# Patient Record
Sex: FEMALE | Race: WHITE | Employment: FULL TIME | URBAN - METROPOLITAN AREA
[De-identification: names, ages, dates, MRNs, and addresses within clinical notes are randomized per-mention and may not be internally consistent; named-entity substitution may affect disease eponyms.]

---

## 2017-01-05 ENCOUNTER — LAB ENCOUNTER (OUTPATIENT)
Dept: LAB | Age: 27
End: 2017-01-05
Attending: DERMATOLOGY
Payer: COMMERCIAL

## 2017-01-05 DIAGNOSIS — L70.0 ACNE VULGARIS: ICD-10-CM

## 2017-01-05 DIAGNOSIS — Z79.899 ENCOUNTER FOR LONG-TERM (CURRENT) USE OF OTHER MEDICATIONS: ICD-10-CM

## 2017-01-05 DIAGNOSIS — L73.2 HIDRADENITIS SUPPURATIVA: Primary | ICD-10-CM

## 2017-01-05 LAB
AST SERPL-CCNC: 17 U/L (ref 15–41)
CHOLEST SERPL-MCNC: 165 MG/DL (ref 110–200)
CK SERPL-CCNC: 65 U/L (ref 38–234)
GGT SERPL-CCNC: 28 U/L (ref 7–50)
HCG SERPL QL: NEGATIVE
HDLC SERPL-MCNC: 34 MG/DL
LDLC SERPL CALC-MCNC: 113 MG/DL (ref 0–99)
NONHDLC SERPL-MCNC: 131 MG/DL
TRIGL SERPL-MCNC: 92 MG/DL (ref 1–149)

## 2017-01-05 PROCEDURE — 82977 ASSAY OF GGT: CPT

## 2017-01-05 PROCEDURE — 80061 LIPID PANEL: CPT

## 2017-01-05 PROCEDURE — 84703 CHORIONIC GONADOTROPIN ASSAY: CPT

## 2017-01-05 PROCEDURE — 82550 ASSAY OF CK (CPK): CPT

## 2017-01-05 PROCEDURE — 84450 TRANSFERASE (AST) (SGOT): CPT

## 2017-01-05 PROCEDURE — 36415 COLL VENOUS BLD VENIPUNCTURE: CPT

## 2017-02-03 ENCOUNTER — LAB ENCOUNTER (OUTPATIENT)
Dept: LAB | Age: 27
End: 2017-02-03
Attending: DERMATOLOGY
Payer: COMMERCIAL

## 2017-02-03 DIAGNOSIS — L57.8 NODULAR ELASTOSIS WITH CYSTS AND COMEDONES OF FAVRE AND RACOUCHOT: Primary | ICD-10-CM

## 2017-02-03 DIAGNOSIS — L70.0 NODULAR ELASTOSIS WITH CYSTS AND COMEDONES OF FAVRE AND RACOUCHOT: Primary | ICD-10-CM

## 2017-02-03 DIAGNOSIS — Z79.899 NEED FOR PROPHYLACTIC CHEMOTHERAPY: ICD-10-CM

## 2017-02-03 DIAGNOSIS — L73.2 HIDRADENITIS: ICD-10-CM

## 2017-02-03 LAB
AST SERPL-CCNC: 18 U/L (ref 15–41)
B-HCG SERPL-ACNC: 0 MIU/ML
CHOLEST SERPL-MCNC: 155 MG/DL (ref 110–200)
CK SERPL-CCNC: 53 U/L (ref 38–234)
GGT SERPL-CCNC: 34 U/L (ref 7–50)
HDLC SERPL-MCNC: 32 MG/DL
LDLC SERPL CALC-MCNC: 102 MG/DL (ref 0–99)
NONHDLC SERPL-MCNC: 123 MG/DL
TRIGL SERPL-MCNC: 107 MG/DL (ref 1–149)

## 2017-02-03 PROCEDURE — 80061 LIPID PANEL: CPT

## 2017-02-03 PROCEDURE — 82550 ASSAY OF CK (CPK): CPT

## 2017-02-03 PROCEDURE — 82977 ASSAY OF GGT: CPT

## 2017-02-03 PROCEDURE — 84450 TRANSFERASE (AST) (SGOT): CPT

## 2017-02-03 PROCEDURE — 36415 COLL VENOUS BLD VENIPUNCTURE: CPT

## 2017-02-03 PROCEDURE — 84702 CHORIONIC GONADOTROPIN TEST: CPT

## 2017-02-06 ENCOUNTER — TELEPHONE (OUTPATIENT)
Dept: FAMILY MEDICINE CLINIC | Facility: CLINIC | Age: 27
End: 2017-02-06

## 2017-02-06 DIAGNOSIS — R00.0 TACHYCARDIA: Primary | ICD-10-CM

## 2017-02-06 NOTE — TELEPHONE ENCOUNTER
Spoke with pt regarding message below. She states that there her HR is usually in the 80s at her therapy appts. I reviewed pt's chart and all her OV HRs are >100. Pt denies chest pain, sob, palpitations, n/v, dizziness, fever, weakness.  Pt states she feels

## 2017-02-06 NOTE — TELEPHONE ENCOUNTER
Therapist notice rested heart rate has been elavated  (90 to 92 ) for last few visits. - Therapist told her to give doctor a call   He seen her yesterday and is not sure if patient has contacted the doctor. Any questions please call Brett Wilkins

## 2017-02-06 NOTE — TELEPHONE ENCOUNTER
lmb ext [phone]   I spoke to Harris Garcia and he stated that he just wanted to inform you that pt resting heart rate has been elevated. He stated she does not have any s/sx.

## 2017-02-07 ENCOUNTER — NURSE ONLY (OUTPATIENT)
Dept: CARDIOLOGY CLINIC | Facility: CLINIC | Age: 27
End: 2017-02-07

## 2017-02-07 DIAGNOSIS — R00.0 TACHYCARDIA, UNSPECIFIED: Primary | ICD-10-CM

## 2017-02-07 PROCEDURE — 93227 XTRNL ECG REC<48 HR R&I: CPT | Performed by: INTERNAL MEDICINE

## 2017-02-07 PROCEDURE — 93225 XTRNL ECG REC<48 HRS REC: CPT | Performed by: INTERNAL MEDICINE

## 2017-02-07 NOTE — TELEPHONE ENCOUNTER
She is always a bit nervous in the office but should make sure nothing else occuring. I have ordered a 24 hour holter monitor. Pt to go to hospital to have it hooked up.

## 2017-02-07 NOTE — TELEPHONE ENCOUNTER
Patient notified of MD's recommendation. Patient verbalized understanding. Given # to Cardiology at [address] [place] [address] to set up appt for 24 hour holter monitor.

## 2017-02-15 NOTE — PROGRESS NOTES
Quick Note:    Tests are all normal. holter monitor.  I suspect her HR rises from mild anxiety with appointments.  ______

## 2017-02-21 ENCOUNTER — TELEPHONE (OUTPATIENT)
Dept: FAMILY MEDICINE CLINIC | Facility: CLINIC | Age: 27
End: 2017-02-21

## 2017-02-21 NOTE — TELEPHONE ENCOUNTER
----- Message from Joann Damon MD sent at 2/15/2017  2:49 PM CST -----  Tests are all normal. holter monitor. I suspect her HR rises from mild anxiety with appointments.

## 2017-03-09 ENCOUNTER — LAB ENCOUNTER (OUTPATIENT)
Dept: LAB | Age: 27
End: 2017-03-09
Attending: DERMATOLOGY
Payer: COMMERCIAL

## 2017-03-09 DIAGNOSIS — L70.0 ACNE VULGARIS: ICD-10-CM

## 2017-03-09 DIAGNOSIS — Z79.899 ENCOUNTER FOR LONG-TERM (CURRENT) DRUG USE: Primary | ICD-10-CM

## 2017-03-09 LAB — B-HCG SERPL-ACNC: <0.6 MIU/ML

## 2017-03-09 PROCEDURE — 36415 COLL VENOUS BLD VENIPUNCTURE: CPT

## 2017-03-09 PROCEDURE — 84702 CHORIONIC GONADOTROPIN TEST: CPT

## 2017-03-14 NOTE — PROGRESS NOTES
Quick Note:    Average heart rate on the holter monitor was in the 80s and normal rhythm throughout.  I believe her tachycardia at office visits is anxiety related and needs no further work up.  ______

## 2017-06-14 ENCOUNTER — LAB ENCOUNTER (OUTPATIENT)
Dept: LAB | Age: 27
End: 2017-06-14
Attending: Other
Payer: COMMERCIAL

## 2017-06-14 DIAGNOSIS — G12.9 SPINAL MUSCULAR ATROPHY, UNSPECIFIED (HCC): Primary | ICD-10-CM

## 2017-06-14 PROCEDURE — 81001 URINALYSIS AUTO W/SCOPE: CPT

## 2017-06-14 PROCEDURE — 82570 ASSAY OF URINE CREATININE: CPT

## 2017-06-14 PROCEDURE — 84156 ASSAY OF PROTEIN URINE: CPT

## 2017-08-24 ENCOUNTER — LAB ENCOUNTER (OUTPATIENT)
Dept: LAB | Age: 27
End: 2017-08-24
Attending: FAMILY MEDICINE
Payer: COMMERCIAL

## 2017-08-24 ENCOUNTER — TELEPHONE (OUTPATIENT)
Dept: FAMILY MEDICINE CLINIC | Facility: CLINIC | Age: 27
End: 2017-08-24

## 2017-08-24 ENCOUNTER — OFFICE VISIT (OUTPATIENT)
Dept: FAMILY MEDICINE CLINIC | Facility: CLINIC | Age: 27
End: 2017-08-24

## 2017-08-24 VITALS — HEART RATE: 97 BPM | SYSTOLIC BLOOD PRESSURE: 109 MMHG | DIASTOLIC BLOOD PRESSURE: 68 MMHG

## 2017-08-24 DIAGNOSIS — R10.9 ABDOMINAL CRAMPING: ICD-10-CM

## 2017-08-24 DIAGNOSIS — G12.1 SPINAL MUSCULAR ATROPHY TYPE III (HCC): Primary | ICD-10-CM

## 2017-08-24 DIAGNOSIS — Z00.00 ROUTINE MEDICAL EXAM: ICD-10-CM

## 2017-08-24 PROCEDURE — 99395 PREV VISIT EST AGE 18-39: CPT | Performed by: FAMILY MEDICINE

## 2017-08-24 PROCEDURE — 96372 THER/PROPH/DIAG INJ SC/IM: CPT | Performed by: FAMILY MEDICINE

## 2017-08-24 PROCEDURE — 83013 H PYLORI (C-13) BREATH: CPT

## 2017-08-24 RX ORDER — OMEPRAZOLE 20 MG/1
20 CAPSULE, DELAYED RELEASE ORAL
Qty: 30 CAPSULE | Refills: 5 | Status: SHIPPED | OUTPATIENT
Start: 2017-08-24 | End: 2017-08-24

## 2017-08-24 RX ORDER — AMITRIPTYLINE HYDROCHLORIDE 25 MG/1
25 TABLET, FILM COATED ORAL NIGHTLY
Qty: 30 TABLET | Refills: 11 | Status: SHIPPED | OUTPATIENT
Start: 2017-08-24 | End: 2018-08-24

## 2017-08-24 RX ORDER — OMEPRAZOLE 20 MG/1
20 CAPSULE, DELAYED RELEASE ORAL
Qty: 30 CAPSULE | Refills: 5 | Status: SHIPPED | OUTPATIENT
Start: 2017-08-24 | End: 2018-08-24

## 2017-08-24 RX ORDER — MEDROXYPROGESTERONE ACETATE 150 MG/ML
150 INJECTION, SUSPENSION INTRAMUSCULAR
Status: SHIPPED | OUTPATIENT
Start: 2017-08-24 | End: 2018-08-19

## 2017-08-24 RX ADMIN — MEDROXYPROGESTERONE ACETATE 150 MG: 150 INJECTION, SUSPENSION INTRAMUSCULAR at 01:00:00

## 2017-08-24 NOTE — PATIENT INSTRUCTIONS
Irritable Bowel Syndrome    Irritable bowel syndrome (IBS) is a disorder of the intestines. It is not a disease, but a group of symptoms caused by changes in the way the intestines work. It is fairly common, but the cause is not well understood.   Symptom · Look for factors that seem to worsen your symptoms. These include stress and emotions.   · Although stress does not cause IBS, it may trigger flare-ups. Counseling can help you learn to handle stress.  So can self-help measures like exercise, yoga, and me © 7378-6162 [address], [address]. All rights reserved. This information is not intended as a substitute for professional medical care. Always follow your healthcare professional's instructions.

## 2017-08-24 NOTE — TELEPHONE ENCOUNTER
[facility] calling regarding Dr. Mo Lee sending them medication but they have not received anything. .. please advise pt is there now

## 2017-08-24 NOTE — PROGRESS NOTES
HPI:   Felipe Velasquez is a 24year old female who presents for a complete physical exam.    Pt here for regular follow physical. Starts school after September.    Recently having bloating, cramping and then needs to go to the bathroom - has a few rounds then pain,denies heartburn  : denies dysuria, vaginal discharge or itching,irregular menses  MUSCULOSKELETAL: denies back pain  NEURO: denies headaches  PSYCHE: denies depression or anxiety  HEMATOLOGIC: denies hx of anemia or easy bruising  ENDOCRINE: denies

## 2017-08-25 LAB — H. PYLORI BREATH TEST: NEGATIVE

## 2017-09-28 ENCOUNTER — HOSPITAL ENCOUNTER (OUTPATIENT)
Age: 27
Discharge: HOME | End: 2017-09-28
Attending: PEDIATRICS
Payer: COMMERCIAL

## 2017-09-28 ENCOUNTER — TELEPHONE (OUTPATIENT)
Dept: SURGERY | Facility: CLINIC | Age: 27
End: 2017-09-28

## 2017-09-28 VITALS
HEART RATE: 118 BPM | HEIGHT: 63 IN | BODY MASS INDEX: 18.61 KG/M2 | DIASTOLIC BLOOD PRESSURE: 81 MMHG | WEIGHT: 105 LBS | RESPIRATION RATE: 20 BRPM | TEMPERATURE: 98 F | OXYGEN SATURATION: 99 % | SYSTOLIC BLOOD PRESSURE: 124 MMHG

## 2017-09-28 DIAGNOSIS — K61.1 PERIRECTAL ABSCESS: Primary | ICD-10-CM

## 2017-09-28 PROCEDURE — 99214 OFFICE O/P EST MOD 30 MIN: CPT

## 2017-09-28 PROCEDURE — 99213 OFFICE O/P EST LOW 20 MIN: CPT

## 2017-09-28 RX ORDER — AMOXICILLIN AND CLAVULANATE POTASSIUM 875; 125 MG/1; MG/1
1 TABLET, FILM COATED ORAL 2 TIMES DAILY
Qty: 20 TABLET | Refills: 0 | Status: SHIPPED | OUTPATIENT
Start: 2017-09-28 | End: 2017-10-08

## 2017-09-28 RX ORDER — AMOXICILLIN AND CLAVULANATE POTASSIUM 875; 125 MG/1; MG/1
1 TABLET, FILM COATED ORAL 2 TIMES DAILY
Qty: 20 TABLET | Refills: 0 | Status: SHIPPED | OUTPATIENT
Start: 2017-09-28 | End: 2017-09-28

## 2017-09-28 NOTE — TELEPHONE ENCOUNTER
Pt was seen at immediate care today for perianal abscess pt was advised to f/u with general sx in 3 days, pls advise thank you.

## 2017-10-03 ENCOUNTER — OFFICE VISIT (OUTPATIENT)
Dept: SURGERY | Facility: CLINIC | Age: 27
End: 2017-10-03

## 2017-10-03 VITALS — HEIGHT: 64 IN | WEIGHT: 106 LBS | BODY MASS INDEX: 18.1 KG/M2

## 2017-10-03 DIAGNOSIS — L02.31 ABSCESS OF RIGHT BUTTOCK: Primary | ICD-10-CM

## 2017-10-03 DIAGNOSIS — L73.2 HIDRADENITIS: ICD-10-CM

## 2017-10-03 PROCEDURE — 99212 OFFICE O/P EST SF 10 MIN: CPT | Performed by: SURGERY

## 2017-10-03 PROCEDURE — 99244 OFF/OP CNSLTJ NEW/EST MOD 40: CPT | Performed by: SURGERY

## 2017-10-03 NOTE — PROGRESS NOTES
History and Physical      Lisbeth Marin is a 24year old female. HPI   Patient presents with:  Cyst: Cyst on buttock, seen at [facility] 9/28/17, prescribed Amoxicillin. Patient states no pain, did have drainage, bloody, but no drainage now.   Patient is whee Social History Main Topics    Smoking status: Never Smoker                                                                Smokeless tobacco: Never Used                        Alcohol use: No              Drug use:  No              Sexual activity: Yes reviewed. Moist heat as able, po abx, dry gauze, close medical and dermatology f/u. Consider excision of area and further w/u for IBD if recurrent infections in the same spot. Continue routine wheelchair habits and skin care.            10/3/2017  Amrit Hearn

## 2017-10-06 ENCOUNTER — LAB ENCOUNTER (OUTPATIENT)
Dept: LAB | Age: 27
End: 2017-10-06
Payer: COMMERCIAL

## 2017-10-06 PROCEDURE — 93005 ELECTROCARDIOGRAM TRACING: CPT

## 2017-10-06 PROCEDURE — 93010 ELECTROCARDIOGRAM REPORT: CPT | Performed by: INTERNAL MEDICINE

## 2017-11-03 ENCOUNTER — TELEPHONE (OUTPATIENT)
Dept: FAMILY MEDICINE CLINIC | Facility: CLINIC | Age: 27
End: 2017-11-03

## 2017-11-10 ENCOUNTER — NURSE ONLY (OUTPATIENT)
Dept: FAMILY MEDICINE CLINIC | Facility: CLINIC | Age: 27
End: 2017-11-10

## 2017-11-10 PROCEDURE — 96372 THER/PROPH/DIAG INJ SC/IM: CPT | Performed by: FAMILY MEDICINE

## 2017-11-10 RX ADMIN — MEDROXYPROGESTERONE ACETATE 150 MG: 150 INJECTION, SUSPENSION INTRAMUSCULAR at 11:29:00

## 2017-11-10 NOTE — PROGRESS NOTES
Patient here today for Depo-provera injection. Verified  and order in epic, patient tolerated injection well. Patient was given calendar to return to clinic in 3 months.

## 2017-12-13 ENCOUNTER — TELEPHONE (OUTPATIENT)
Dept: FAMILY MEDICINE CLINIC | Facility: CLINIC | Age: 27
End: 2017-12-13

## 2017-12-13 NOTE — TELEPHONE ENCOUNTER
Pt is requesting to fax a copy of [place] [facility]     Fax # [phone]

## 2018-02-05 ENCOUNTER — PATIENT MESSAGE (OUTPATIENT)
Dept: FAMILY MEDICINE CLINIC | Facility: CLINIC | Age: 28
End: 2018-02-05

## 2018-02-05 ENCOUNTER — NURSE TRIAGE (OUTPATIENT)
Dept: OTHER | Age: 28
End: 2018-02-05

## 2018-02-05 ENCOUNTER — HOSPITAL ENCOUNTER (OUTPATIENT)
Age: 28
Discharge: HOME | End: 2018-02-05
Attending: FAMILY MEDICINE
Payer: COMMERCIAL

## 2018-02-05 DIAGNOSIS — L73.2 HYDRADENITIS: Primary | ICD-10-CM

## 2018-02-05 PROCEDURE — 99212 OFFICE O/P EST SF 10 MIN: CPT

## 2018-02-05 PROCEDURE — 99213 OFFICE O/P EST LOW 20 MIN: CPT

## 2018-02-05 NOTE — TELEPHONE ENCOUNTER
Action Requested: Summary for Provider     []  Critical Lab, Recommendations Needed  [] Need Additional Advice  []   FYI    []   Need Orders  [] Need Medications Sent to Pharmacy  []  Other     SUMMARY: Pt was scheduled for appt tomorrow.      Pt has been s

## 2018-02-05 NOTE — TELEPHONE ENCOUNTER
From: Erick Magallanes  To: Rafiq Wright MD  Sent: 2/5/2018 10:59 AM CST  Subject: Non-Urgent Medical Question    Hi Dr. Fredis Sandoval,  I have an appointment scheduled for tomorrow (2/6) at 9:30 am for a cyst under my left arm that is hurting.  I was wondering if

## 2018-02-05 NOTE — ED PROVIDER NOTES
Patient Seen in: [facility] [place]    History   Patient presents with:  Abscess (integumentary)    Stated Complaint: lt breast cyst    HPI    Pt is a 23 yo with a history of hidranitis.  She has been on clindamycin and rifampin develop.           Disposition and Plan     Clinical Impression:  Hydradenitis  (primary encounter diagnosis)    Disposition:  Discharge  2/5/2018 10:24 am    Follow-up:  Toya Brown, [address] [place] [address] 30

## 2018-02-05 NOTE — ED INITIAL ASSESSMENT (HPI)
Has had a ?abcess under her left arm for 2 months she has gone to a dermatologist and had it injected  with steroids and wants it injected again currently on rifampin and clindamycin.  superficial red area under axilla area

## 2018-02-07 NOTE — TELEPHONE ENCOUNTER
Spoke with Pt. She states she went to immediate care on 2/5, but they were not able to give her the cortisone injection. Pt canceled her appt with Dr. Steve Mack on 2/6, states swelling had gone down and pain is now bearable.  Pt states she will wait until her rik

## 2018-03-14 ENCOUNTER — LAB ENCOUNTER (OUTPATIENT)
Dept: LAB | Age: 28
End: 2018-03-14
Attending: DERMATOLOGY
Payer: COMMERCIAL

## 2018-03-14 DIAGNOSIS — Z11.1 SCREENING FOR TUBERCULOSIS: ICD-10-CM

## 2018-03-14 DIAGNOSIS — Z51.81 MEDICATION MONITORING ENCOUNTER: ICD-10-CM

## 2018-03-14 DIAGNOSIS — Z11.59 SCREENING FOR VIRAL DISEASE: Primary | ICD-10-CM

## 2018-03-14 LAB
ALBUMIN SERPL BCP-MCNC: 4.2 G/DL (ref 3.5–4.8)
ALBUMIN/GLOB SERPL: 1.1 {RATIO} (ref 1–2)
ALP SERPL-CCNC: 191 U/L (ref 32–100)
ALT SERPL-CCNC: 58 U/L (ref 14–54)
ANION GAP SERPL CALC-SCNC: 10 MMOL/L (ref 0–18)
AST SERPL-CCNC: 45 U/L (ref 15–41)
BILIRUB SERPL-MCNC: 0.5 MG/DL (ref 0.3–1.2)
BUN SERPL-MCNC: 2 MG/DL (ref 8–20)
BUN/CREAT SERPL: 11.8 (ref 10–20)
CALCIUM SERPL-MCNC: 9.4 MG/DL (ref 8.5–10.5)
CHLORIDE SERPL-SCNC: 101 MMOL/L (ref 95–110)
CO2 SERPL-SCNC: 27 MMOL/L (ref 22–32)
CREAT SERPL-MCNC: 0.17 MG/DL (ref 0.5–1.5)
GLOBULIN PLAS-MCNC: 3.8 G/DL (ref 2.5–3.7)
GLUCOSE SERPL-MCNC: 110 MG/DL (ref 70–99)
OSMOLALITY UR CALC.SUM OF ELEC: 283 MOSM/KG (ref 275–295)
PATIENT FASTING: NO
POTASSIUM SERPL-SCNC: 3.6 MMOL/L (ref 3.3–5.1)
PROT SERPL-MCNC: 8 G/DL (ref 5.9–8.4)
SODIUM SERPL-SCNC: 138 MMOL/L (ref 136–144)

## 2018-03-14 PROCEDURE — 87522 HEPATITIS C REVRS TRNSCRPJ: CPT

## 2018-03-14 PROCEDURE — 86704 HEP B CORE ANTIBODY TOTAL: CPT

## 2018-03-14 PROCEDURE — 85025 COMPLETE CBC W/AUTO DIFF WBC: CPT

## 2018-03-14 PROCEDURE — 86706 HEP B SURFACE ANTIBODY: CPT

## 2018-03-14 PROCEDURE — 86803 HEPATITIS C AB TEST: CPT

## 2018-03-14 PROCEDURE — 80053 COMPREHEN METABOLIC PANEL: CPT

## 2018-03-14 PROCEDURE — 36415 COLL VENOUS BLD VENIPUNCTURE: CPT

## 2018-03-14 PROCEDURE — 87340 HEPATITIS B SURFACE AG IA: CPT

## 2018-03-14 PROCEDURE — 86480 TB TEST CELL IMMUN MEASURE: CPT

## 2018-03-15 ENCOUNTER — LAB ENCOUNTER (OUTPATIENT)
Dept: LAB | Age: 28
End: 2018-03-15
Attending: DERMATOLOGY
Payer: COMMERCIAL

## 2018-03-15 DIAGNOSIS — Z11.59 SCREENING FOR VIRAL DISEASE: ICD-10-CM

## 2018-03-15 DIAGNOSIS — Z51.81 MEDICATION MONITORING ENCOUNTER: ICD-10-CM

## 2018-03-15 DIAGNOSIS — Z11.1 SCREENING FOR TUBERCULOSIS: ICD-10-CM

## 2018-03-15 LAB
BASOPHILS # BLD: 0.1 K/UL (ref 0–0.2)
BASOPHILS NFR BLD: 1 %
EOSINOPHIL # BLD: 0.2 K/UL (ref 0–0.7)
EOSINOPHIL NFR BLD: 3 %
ERYTHROCYTE [DISTWIDTH] IN BLOOD BY AUTOMATED COUNT: 13.2 % (ref 11–15)
HBV CORE AB SERPL QL IA: NONREACTIVE
HBV SURFACE AB SER-ACNC: 201.49 MIU/ML (ref ?–10)
HBV SURFACE AG SERPL QL IA: NONREACTIVE
HBV SURFACE AG SERPL QL IA: REACTIVE
HCT VFR BLD AUTO: 37.2 % (ref 35–48)
HCV AB SERPL QL IA: NONREACTIVE
HGB BLD-MCNC: 12.8 G/DL (ref 12–16)
LYMPHOCYTES # BLD: 1.4 K/UL (ref 1–4)
LYMPHOCYTES NFR BLD: 19 %
MCH RBC QN AUTO: 29.6 PG (ref 27–32)
MCHC RBC AUTO-ENTMCNC: 34.3 G/DL (ref 32–37)
MCV RBC AUTO: 86.3 FL (ref 80–100)
MONOCYTES # BLD: 0.4 K/UL (ref 0–1)
MONOCYTES NFR BLD: 5 %
NEUTROPHILS # BLD AUTO: 5.5 K/UL (ref 1.8–7.7)
NEUTROPHILS NFR BLD: 73 %
PLATELET # BLD AUTO: 351 K/UL (ref 140–400)
PMV BLD AUTO: 8.1 FL (ref 7.4–10.3)
RBC # BLD AUTO: 4.31 M/UL (ref 3.7–5.4)
WBC # BLD AUTO: 7.5 K/UL (ref 4–11)

## 2018-03-15 PROCEDURE — 36415 COLL VENOUS BLD VENIPUNCTURE: CPT

## 2018-03-15 PROCEDURE — 85025 COMPLETE CBC W/AUTO DIFF WBC: CPT

## 2018-03-16 LAB
M TB IFN-G CD4+ BCKGRND COR BLD-ACNC: 0.03 IU/ML
M TB IFN-G CD4+ T-CELLS BLD-ACNC: 0.04 IU/ML
M TB TUBERC IFN-G BLD QL: NEGATIVE
M TB TUBERC IGNF/MITOGEN IGNF CONTROL: 3.91 IU/ML

## 2018-03-20 ENCOUNTER — TELEPHONE (OUTPATIENT)
Dept: OBGYN CLINIC | Facility: CLINIC | Age: 28
End: 2018-03-20

## 2018-03-26 ENCOUNTER — TELEPHONE (OUTPATIENT)
Dept: FAMILY MEDICINE CLINIC | Facility: CLINIC | Age: 28
End: 2018-03-26

## 2018-03-26 NOTE — TELEPHONE ENCOUNTER
Progress note from Dr. Fariha Rodriguez office dated 3/19/18 received via fax and placed on [address] for review.

## 2018-03-29 ENCOUNTER — TELEPHONE (OUTPATIENT)
Dept: OBGYN CLINIC | Facility: CLINIC | Age: 28
End: 2018-03-29

## 2018-03-29 NOTE — TELEPHONE ENCOUNTER
[facility] history of hidradenitis suppurativa. Derm recommended IUD vs OCP. Pt would like OCP and derm provided this.  Pt was aware of risks and will follow up with me as scheduled for annual. Documentation sent for

## 2018-03-30 ENCOUNTER — TELEPHONE (OUTPATIENT)
Dept: OBGYN CLINIC | Facility: CLINIC | Age: 28
End: 2018-03-30

## 2018-03-30 NOTE — TELEPHONE ENCOUNTER
Lamar He signed on letter from [address] [place] [address] letter dated 3/19/18 from Jodie Tapia, [address] of Dermatology.

## 2018-05-07 ENCOUNTER — PATIENT MESSAGE (OUTPATIENT)
Dept: FAMILY MEDICINE CLINIC | Facility: CLINIC | Age: 28
End: 2018-05-07

## 2018-05-07 NOTE — TELEPHONE ENCOUNTER
Dr Ernesto Moreira,    See pts' mychart message. Please respond to pool: EM FM ADO LPN/CMA contact pt when form is completed.

## 2018-05-07 NOTE — TELEPHONE ENCOUNTER
From: Ollie Christina  To: Anson Siemens, MD  Sent: 5/7/2018 11:03 AM CDT  Subject: Non-Urgent Medical Question    Hi Dr. Sam Tan,  At the end of March I had my mom bring in a certification form for the renewal of my disability parking placard.  She left it w

## 2018-05-10 ENCOUNTER — TELEPHONE (OUTPATIENT)
Dept: OBGYN CLINIC | Facility: CLINIC | Age: 28
End: 2018-05-10

## 2018-05-11 NOTE — TELEPHONE ENCOUNTER
Patient's mother dropped off another copy of form to be completed. Placed in Dr's file. Please mail it when complete. Envelope attached to form.

## 2018-07-03 ENCOUNTER — TELEPHONE (OUTPATIENT)
Dept: OBGYN CLINIC | Facility: CLINIC | Age: 28
End: 2018-07-03

## 2018-10-03 ENCOUNTER — OFFICE VISIT (OUTPATIENT)
Dept: FAMILY MEDICINE CLINIC | Facility: CLINIC | Age: 28
End: 2018-10-03
Payer: COMMERCIAL

## 2018-10-03 VITALS — DIASTOLIC BLOOD PRESSURE: 70 MMHG | SYSTOLIC BLOOD PRESSURE: 109 MMHG | HEART RATE: 99 BPM

## 2018-10-03 DIAGNOSIS — L73.2 HYDRADENITIS: ICD-10-CM

## 2018-10-03 DIAGNOSIS — Z00.00 ROUTINE MEDICAL EXAM: ICD-10-CM

## 2018-10-03 DIAGNOSIS — L70.8 OTHER ACNE: ICD-10-CM

## 2018-10-03 DIAGNOSIS — G12.1 SPINAL MUSCULAR ATROPHY TYPE III (HCC): Primary | ICD-10-CM

## 2018-10-03 PROCEDURE — 90471 IMMUNIZATION ADMIN: CPT | Performed by: FAMILY MEDICINE

## 2018-10-03 PROCEDURE — 90732 PPSV23 VACC 2 YRS+ SUBQ/IM: CPT | Performed by: FAMILY MEDICINE

## 2018-10-03 PROCEDURE — 90686 IIV4 VACC NO PRSV 0.5 ML IM: CPT | Performed by: FAMILY MEDICINE

## 2018-10-03 PROCEDURE — 90472 IMMUNIZATION ADMIN EACH ADD: CPT | Performed by: FAMILY MEDICINE

## 2018-10-03 PROCEDURE — 99395 PREV VISIT EST AGE 18-39: CPT | Performed by: FAMILY MEDICINE

## 2018-10-03 RX ORDER — LEVONORGESTREL AND ETHINYL ESTRADIOL 0.15-0.03
KIT ORAL
COMMUNITY
Start: 2018-01-04 | End: 2018-10-03

## 2018-10-03 RX ORDER — LEVONORGESTREL AND ETHINYL ESTRADIOL 0.15-0.03
1 KIT ORAL DAILY
Qty: 91 TABLET | Refills: 4 | Status: SHIPPED | OUTPATIENT
Start: 2018-10-03 | End: 2019-11-29

## 2018-10-03 NOTE — PROGRESS NOTES
HPI:   Vi Green is a 34year old female who presents for a complete physical exam.    Here for regular physical.   Recently placed on Humira -getting 2 shots a week now for the skin issues - was having bad hidradinitis.  Had to stop the depo as it can menses  MUSCULOSKELETAL: denies back pain - continues working with therapist   NEURO: denies headaches  PSYCHE: denies depression or anxiety  HEMATOLOGIC: denies hx of anemia or easy bruising  ENDOCRINE: denies weight changes  ALL/ASTHMA: denies hx of ratna

## 2018-11-01 NOTE — TELEPHONE ENCOUNTER
Crystal Coyne from [address] [place] [address] indicated that did not receive the omeprazole or the amitriptyline that was sent today. Gave pharmacist verbal on both medications.      Pharmacy     [facility] [address], [address] [facility] [phone], [phone] same as above

## 2018-11-15 ENCOUNTER — OFFICE VISIT (OUTPATIENT)
Dept: OBGYN CLINIC | Facility: CLINIC | Age: 28
End: 2018-11-15
Payer: COMMERCIAL

## 2018-11-15 VITALS
BODY MASS INDEX: 19.29 KG/M2 | WEIGHT: 113 LBS | HEART RATE: 112 BPM | DIASTOLIC BLOOD PRESSURE: 77 MMHG | SYSTOLIC BLOOD PRESSURE: 119 MMHG | HEIGHT: 64 IN

## 2018-11-15 DIAGNOSIS — Z01.419 ENCOUNTER FOR GYNECOLOGICAL EXAMINATION WITHOUT ABNORMAL FINDING: Primary | ICD-10-CM

## 2018-11-15 PROCEDURE — 99395 PREV VISIT EST AGE 18-39: CPT | Performed by: OBSTETRICS & GYNECOLOGY

## 2018-11-15 NOTE — PROGRESS NOTES
Well Woman Exam    HPI:  The patient is a 35yo female who presents for annual exam.   She has muscular dystrophy and hydradenitis. She has no complaints. She is doing well with OCPs. She has control of when her menses are and they are light.    Patient anne marie OCP    Other Topics      Concerns:         Service: Not Asked        Blood Transfusions: Not Asked        Caffeine Concern: No        Occupational Exposure: Not Asked        Hobby Hazards: Not Asked        Sleep Concern: Not Asked        Stress Con Pulse: 112       PHYSICAL EXAM:   Constitutional: well developed, well nourished  Head/Face: normocephalic  Neck/Thyroid: thyroid symmetric, no thyromegaly, no nodules, no adenopathy  Heart: Regular rate and rhythm   Lungs: clear to ascultation bilateral

## 2019-07-10 NOTE — ED INITIAL ASSESSMENT (HPI)
Presents with \"cyst\" on right buttocks. First noticed symptoms 2 weeks ago. Unsure if drainage, reports increased pain. Pt reports she gets this frequently. Denies fevers or chills. Emotionally abused: None     Physically abused: None     Forced sexual activity: None   Other Topics Concern    None   Social History Narrative    None     Family History   Problem Relation Age of Onset    Hearing Loss Brother     High Blood Pressure Maternal Grandmother     Arthritis Maternal Grandfather          PHYSICAL EXAM    VITAL SIGNS: BP (!) 145/118   Pulse 79   Temp 98.4 °F (36.9 °C)   Resp 16   Ht 5' 8\" (1.727 m)   Wt 232 lb (105.2 kg)   LMP 06/15/2019   SpO2 100%   BMI 35.28 kg/m²    Constitutional:  Well developed, Well nourished  HENT:  Normocephalic, Atraumatic, PERRL. EOMI. Sclera clear. Conjunctiva normal, No discharge. Neck/Lymphatics: supple, no JVD, no swollen nodes  Cardiovascular:  Normal heart rate, Normal rhythm, No murmurs  Respiratory:  Nonlabored breathing. Normal breath sounds, No wheezing  Abdomen: Bowel sounds normal, Soft, No tenderness, no masses. Musculoskeletal: No edema, No tenderness, No cyanosis  Integument:  Warm, Dry  Neurologic:  Alert & oriented , No focal deficits noted. Cranial nerves II through XII grossly intact. Finger to nose intact, rapid alternating movements intact. Normal gross motor coordination & motor strength bilateral upper and lower extremities, upper and lower extremity DTRs intact. Sensation intact.   Psychiatric:  Affect normal, Mood normal.       Labs:  Results for orders placed or performed during the hospital encounter of 07/09/19   CBC w/ auto diff   Result Value Ref Range    WBC 8.6 4.0 - 10.5 K/CU MM    RBC 4.84 4.2 - 5.4 M/CU MM    Hemoglobin 13.5 12.5 - 16.0 GM/DL    Hematocrit 39.4 37 - 47 %    MCV 81.4 78 - 100 FL    MCH 27.9 27 - 31 PG    MCHC 34.3 32.0 - 36.0 %    RDW 13.8 11.7 - 14.9 %    Platelets 516 878 - 831 K/CU MM    MPV 10.8 7.5 - 11.1 FL    Differential Type AUTOMATED DIFFERENTIAL     Segs Relative 72.5 (H) 36 - 66 %    Lymphocytes % 21.8 (L) 24 - 44 %    Monocytes % 4.5 (H) 0 - 4 %    Eosinophils % 0.8 0 - 3 % Basophils % 0.2 0 - 1 %    Segs Absolute 6.3 K/CU MM    Lymphocytes # 1.9 K/CU MM    Monocytes # 0.4 K/CU MM    Eosinophils # 0.1 K/CU MM    Basophils # 0.0 K/CU MM    Nucleated RBC % 0.0 %    Total Nucleated RBC 0.0 K/CU MM    Total Immature Neutrophil 0.02 K/CU MM    Immature Neutrophil % 0.2 0 - 0.43 %   CMP   Result Value Ref Range    Sodium 141 135 - 145 MMOL/L    Potassium 3.8 3.5 - 5.1 MMOL/L    Chloride 106 99 - 110 mMol/L    CO2 25 21 - 32 MMOL/L    BUN 9 6 - 23 MG/DL    CREATININE 0.9 0.6 - 1.1 MG/DL    Glucose 104 (H) 70 - 99 MG/DL    Calcium 9.5 8.3 - 10.6 MG/DL    Alb 4.3 3.4 - 5.0 GM/DL    Total Protein 8.0 6.4 - 8.2 GM/DL    Total Bilirubin 0.3 0.0 - 1.0 MG/DL    ALT 15 10 - 40 U/L    AST 19 15 - 37 IU/L    Alkaline Phosphatase 70 40 - 129 IU/L    GFR Non-African American >60 >60 mL/min/1.73m2    GFR African American >60 >60 mL/min/1.73m2    Anion Gap 10 4 - 16   Troponin   Result Value Ref Range    Troponin T <0.010 <0.01 NG/ML   EKG 12 Lead   Result Value Ref Range    Ventricular Rate 79 BPM    Atrial Rate 79 BPM    P-R Interval 188 ms    QRS Duration 86 ms    Q-T Interval 364 ms    QTc Calculation (Bazett) 417 ms    P Axis 39 degrees    R Axis 1 degrees    T Axis 9 degrees    Diagnosis       Normal sinus rhythm  Minimal voltage criteria for LVH, may be normal variant  Borderline ECG  When compared with ECG of 14-AUG-2018 09:39,  No significant change was found             EKG    See attending note    RADIOLOGY    Ct Head Wo Contrast    Result Date: 7/9/2019  EXAMINATION: CT OF THE HEAD WITHOUT CONTRAST,  7/9/2019 10:37 pm TECHNIQUE: CT of the head was performed without the administration of intravenous contrast. Dose modulation, iterative reconstruction, and/or weight based adjustment of the mA/kV was utilized to reduce the radiation dose to as low as reasonably achievable.  COMPARISON: None HISTORY: ORDERING SYSTEM PROVIDED HISTORY: HA TECHNOLOGIST PROVIDED HISTORY: Has a \"code stroke\" or \"stroke

## 2019-09-03 ENCOUNTER — TELEPHONE (OUTPATIENT)
Dept: FAMILY MEDICINE CLINIC | Facility: CLINIC | Age: 29
End: 2019-09-03

## 2019-09-03 NOTE — TELEPHONE ENCOUNTER
[facility] form - Residual capacity form recvd in Forms Dept. Called pt to make a current appt before forms can be filled out and sign HIPAA/fee. Also sent pt Work Marketh[employer] message for missing HIPAA/Fee.

## 2019-09-24 ENCOUNTER — TELEPHONE (OUTPATIENT)
Dept: HEMATOLOGY/ONCOLOGY | Facility: HOSPITAL | Age: 29
End: 2019-09-24

## 2019-10-02 ENCOUNTER — OFFICE VISIT (OUTPATIENT)
Dept: HEMATOLOGY/ONCOLOGY | Facility: HOSPITAL | Age: 29
End: 2019-10-02
Attending: FAMILY MEDICINE
Payer: COMMERCIAL

## 2019-10-02 VITALS
WEIGHT: 106 LBS | RESPIRATION RATE: 16 BRPM | TEMPERATURE: 99 F | OXYGEN SATURATION: 97 % | DIASTOLIC BLOOD PRESSURE: 80 MMHG | BODY MASS INDEX: 19 KG/M2 | SYSTOLIC BLOOD PRESSURE: 109 MMHG | HEART RATE: 100 BPM

## 2019-10-02 DIAGNOSIS — L73.2 HYDRADENITIS: Primary | ICD-10-CM

## 2019-10-02 PROCEDURE — 96375 TX/PRO/DX INJ NEW DRUG ADDON: CPT

## 2019-10-02 PROCEDURE — 96413 CHEMO IV INFUSION 1 HR: CPT

## 2019-10-02 PROCEDURE — 96415 CHEMO IV INFUSION ADDL HR: CPT

## 2019-10-02 RX ORDER — DIPHENHYDRAMINE HYDROCHLORIDE 50 MG/ML
25 INJECTION INTRAMUSCULAR; INTRAVENOUS ONCE
Status: COMPLETED | OUTPATIENT
Start: 2019-10-02 | End: 2019-10-02

## 2019-10-02 RX ORDER — ACETAMINOPHEN 325 MG/1
650 TABLET ORAL ONCE
Status: CANCELLED | OUTPATIENT
Start: 2019-10-16

## 2019-10-02 RX ORDER — ACETAMINOPHEN 325 MG/1
TABLET ORAL
Status: COMPLETED
Start: 2019-10-02 | End: 2019-10-02

## 2019-10-02 RX ORDER — DIPHENHYDRAMINE HYDROCHLORIDE 50 MG/ML
INJECTION INTRAMUSCULAR; INTRAVENOUS
Status: COMPLETED
Start: 2019-10-02 | End: 2019-10-02

## 2019-10-02 RX ORDER — ACETAMINOPHEN 325 MG/1
650 TABLET ORAL ONCE
Status: COMPLETED | OUTPATIENT
Start: 2019-10-02 | End: 2019-10-02

## 2019-10-02 RX ORDER — DIPHENHYDRAMINE HYDROCHLORIDE 50 MG/ML
25 INJECTION INTRAMUSCULAR; INTRAVENOUS ONCE
Status: CANCELLED | OUTPATIENT
Start: 2019-10-16

## 2019-10-02 RX ADMIN — DIPHENHYDRAMINE HYDROCHLORIDE 25 MG: 50 INJECTION INTRAMUSCULAR; INTRAVENOUS at 15:51:00

## 2019-10-02 RX ADMIN — ACETAMINOPHEN 650 MG: 325 TABLET ORAL at 15:50:00

## 2019-10-02 NOTE — PROGRESS NOTES
Patient to center for Remicade Day 0 for hydradenitis   Caro Mccall arrived in wheel chair with her mother. Caro Mccall wishes to stay in her chair for the infusion as she has difficulty transferring due to her r muscular dystrophy  She states she is feeling well at this

## 2019-10-16 ENCOUNTER — HOSPITAL ENCOUNTER (OUTPATIENT)
Facility: HOSPITAL | Age: 29
Setting detail: OBSERVATION
Discharge: HOME | End: 2019-10-18
Attending: EMERGENCY MEDICINE | Admitting: HOSPITALIST
Payer: COMMERCIAL

## 2019-10-16 ENCOUNTER — APPOINTMENT (OUTPATIENT)
Dept: HEMATOLOGY/ONCOLOGY | Facility: HOSPITAL | Age: 29
End: 2019-10-16
Attending: FAMILY MEDICINE
Payer: COMMERCIAL

## 2019-10-16 DIAGNOSIS — R13.10 DYSPHAGIA, UNSPECIFIED TYPE: Primary | ICD-10-CM

## 2019-10-16 PROCEDURE — 99204 OFFICE O/P NEW MOD 45 MIN: CPT | Performed by: OTHER

## 2019-10-16 PROCEDURE — 99220 INITIAL OBSERVATION CARE,LEVL III: CPT | Performed by: HOSPITALIST

## 2019-10-16 RX ORDER — LEVONORGESTREL AND ETHINYL ESTRADIOL 0.15-0.03
1 KIT ORAL NIGHTLY
Status: DISCONTINUED | OUTPATIENT
Start: 2019-10-16 | End: 2019-10-18

## 2019-10-16 RX ORDER — SPIRONOLACTONE 50 MG/1
100 TABLET, FILM COATED ORAL DAILY
Status: DISCONTINUED | OUTPATIENT
Start: 2019-10-16 | End: 2019-10-18

## 2019-10-16 RX ORDER — ACETAMINOPHEN 325 MG/1
650 TABLET ORAL EVERY 6 HOURS PRN
Status: DISCONTINUED | OUTPATIENT
Start: 2019-10-16 | End: 2019-10-18

## 2019-10-16 RX ORDER — LEVONORGESTREL AND ETHINYL ESTRADIOL 0.15-0.03
1 KIT ORAL DAILY
Status: DISCONTINUED | OUTPATIENT
Start: 2019-10-16 | End: 2019-10-16

## 2019-10-16 RX ORDER — SPIRONOLACTONE 50 MG/1
100 TABLET, FILM COATED ORAL DAILY
COMMUNITY

## 2019-10-16 RX ORDER — SODIUM CHLORIDE 0.9 % (FLUSH) 0.9 %
3 SYRINGE (ML) INJECTION AS NEEDED
Status: DISCONTINUED | OUTPATIENT
Start: 2019-10-16 | End: 2019-10-18

## 2019-10-16 RX ORDER — ONDANSETRON 2 MG/ML
4 INJECTION INTRAMUSCULAR; INTRAVENOUS EVERY 6 HOURS PRN
Status: DISCONTINUED | OUTPATIENT
Start: 2019-10-16 | End: 2019-10-18

## 2019-10-16 NOTE — ED INITIAL ASSESSMENT (HPI)
First dose Remicade two weeks ago for hydradenitis suppurativa. Pt experienced difficulty swallowing (starting with kiwi). She is now only able to tolerate liquid. No respiratory distress noted.       She was told by her dermatologist at [facility] that this may

## 2019-10-16 NOTE — ED PROVIDER NOTES
Patient Seen in: [facility] Emergency Department      History   Patient presents with:   Allergic Rxn Allergies (immune)    Stated Complaint: allergic reaction    HPI    Patient is a 28-year-old female with spinal muscular atrophy who presents with non tender  CV: RRR, no murmurs  Resp: CTAB, no wheezes or retractions  Extremities: no cyanosis/clubbing/edema  Neuro: CN intact, normal speech  SKIN: warm, dry, no rashes        ED Course     Labs Reviewed   BASIC METABOLIC PANEL (8) - Abnormal; Notable Disposition and Plan     Clinical Impression:  Dysphagia, unspecified type  (primary encounter diagnosis)    Disposition:  Admit  10/16/2019  1:01 pm    Follow-up:  No follow-up provider specified.       Medications Prescribed:  Current Discha

## 2019-10-16 NOTE — H&P
[facility]    PATIENT'S NAME: Rosi Lyn   ATTENDING PHYSICIAN: Doug Foster MD   PATIENT ACCOUNT#:   [id number]    LOCATION:  [address]  MEDICAL RECORD #:   [id number]       YOB: 1990  ADMISSION DATE:       10/16/201 acute distress. VITAL SIGNS:  Temperature 98.7, pulse 107, respiratory rate 18, blood pressure 108/75, pulse ox 97% on room air. HEENT:  Atraumatic. Oropharynx clear. Dry mucous membranes. Normal hard and soft palate. Eyes:  Anicteric sclerae.   Pupil

## 2019-10-16 NOTE — SLP NOTE
ADULT SWALLOWING EVALUATION    ASSESSMENT    ASSESSMENT/OVERALL IMPRESSION:  Pt seen sitting upright in bed for all PO trials and evaluation. Pt described new onset of dysphagia following recent injection to spine approx 2 weeks prior to this admission.  Pt of pharyngeal dysfunction and R/O aspiration. Collaborated results with RN. FCM score of 6/7. RECOMMENDATIONS   Diet Recommendations - Solids: Regular  Diet Recommendations - Liquid:  Thin     Medication Administration Recommendations: Crushed in puree silent aspiration.)    Esophageal Phase of Swallow: No complaints consistent with possible esophageal involvement    GOALS  Goal #1 The patient will tolerate general consistency and thin liquids without overt signs or symptoms of aspiration with 100 % accu

## 2019-10-16 NOTE — PLAN OF CARE
Problem: Patient Centered Care  Goal: Patient preferences are identified and integrated in the patient's plan of care  Description  Interventions:  - What would you like us to know as we care for you?  Ble weakness  - Provide timely, complete, and accurat

## 2019-10-16 NOTE — ED NOTES
Orders for admission, patient is aware of plan and ready to go upstairs. Any questions, please call ED RN   at extension [phone].

## 2019-10-16 NOTE — CONSULTS
[facility]    Report of Consultation    Veena [place] Patient Status:  Emergency    1990 MRN [id number]   Location [address] Attending Nikkie Sin MD   [facility] Day # 0 PCP Roseline Johnson MD     Date Current Medications:  No current facility-administered medications for this encounter.      (Not in a hospital admission)      Allergies    Minocycline             RASH  Shellfish-Derived P*    UNKNOWN    Comment:Per allergy test    Review of Systems: Babinski sign    Coordination:  Finger to nose clumsy  Rapid alternating movements clumsy    Gait:  Wheelchair-bound    Results:     Laboratory Data:  Lab Results   Component Value Date    WBC 7.1 10/16/2019    HGB 14.3 10/16/2019    HCT 41.5 10/16/2019

## 2019-10-17 ENCOUNTER — APPOINTMENT (OUTPATIENT)
Dept: GENERAL RADIOLOGY | Facility: HOSPITAL | Age: 29
End: 2019-10-17
Attending: HOSPITALIST
Payer: COMMERCIAL

## 2019-10-17 ENCOUNTER — APPOINTMENT (OUTPATIENT)
Dept: MRI IMAGING | Facility: HOSPITAL | Age: 29
End: 2019-10-17
Attending: HOSPITALIST
Payer: COMMERCIAL

## 2019-10-17 PROCEDURE — 74230 X-RAY XM SWLNG FUNCJ C+: CPT | Performed by: HOSPITALIST

## 2019-10-17 PROCEDURE — 99226 SUBSEQUENT OBSERVATION CARE: CPT | Performed by: HOSPITALIST

## 2019-10-17 PROCEDURE — 70553 MRI BRAIN STEM W/O & W/DYE: CPT | Performed by: HOSPITALIST

## 2019-10-17 PROCEDURE — 72156 MRI NECK SPINE W/O & W/DYE: CPT | Performed by: HOSPITALIST

## 2019-10-17 PROCEDURE — 99225 SUBSEQUENT OBSERVATION CARE: CPT | Performed by: OTHER

## 2019-10-17 NOTE — PLAN OF CARE
Problem: Patient Centered Care  Goal: Patient preferences are identified and integrated in the patient's plan of care  Description  Interventions:  - What would you like us to know as we care for you?   - Provide timely, complete, and accurate informatio of food and small sips of liquid  - Offer pills one at a time, crush or deliver with applesauce as needed  - Discontinue feeding and notify MD (or speech pathologist) if coughing or persistent throat clearing or wet/gurgly vocal quality is noted  Outcome:

## 2019-10-17 NOTE — DIETARY NOTE
ADULT NUTRITION INITIAL ASSESSMENT    Pt is at moderate nutrition risk. Pt does not meet malnutrition criteria.       RECOMMENDATIONS TO MD:  See Nutrition Intervention     NUTRITION DIAGNOSIS/PROBLEM:  Inadequate oral intake related to altered GI function kg/m2 - underweight  IBW: 115 lbs        92% IBW  Usual Body Wt: 106 lbs       100% UBW    WEIGHT HISTORY:  Patient Weight(s) for the past 336 hrs:   Weight   10/16/19 1013 48.1 kg (106 lb)     Wt Readings from Last 10 Encounters:  10/16/19 : 48.1 kg (106 greater than 75% of needs, return to normal GI function, labs WNL and prevent skin breakdown      DIETITIAN FOLLOW UP: RD to follow up within 7 days     [address], [address] [facility], C/ Johnie Osei

## 2019-10-17 NOTE — PROGRESS NOTES
[facility] AND [facility]  Neurology Progress Note    Cali Odell Patient Status:  Observation    1990 MRN [id number]   Location [facility] Attending Le Muhammad MD   Hosp Day # 0 PCP Chu Garza MD     Subjective:  Cali Odell is a( 5     Cranial Nerves:    VII. Face symmetric, no facial weakness  VIII. Hearing intact. IX. Pallet elevates symmetrically. XI. Shoulder shrug is intact  XII.  Tongue is midline     Motor Exam:  Muscle tone diminished in all 4 extremities  Atrophy of dista additionally check myasthenia gravis panel    Micki Solomon  10/17/2019  8:03 AM

## 2019-10-17 NOTE — PLAN OF CARE
Problem: Patient Centered Care  Goal: Patient preferences are identified and integrated in the patient's plan of care  Description  Interventions:  - What would you like us to know as we care for you?  Involve in [address]  - Provide timely, complete, and accur Encourage small bites of food and small sips of liquid  - Offer pills one at a time, crush or deliver with applesauce as needed  - Discontinue feeding and notify MD (or speech pathologist) if coughing or persistent throat clearing or wet/gurgly vocal quali

## 2019-10-17 NOTE — SLP NOTE
ADULT VIDEOFLUOROSCOPIC SWALLOWING STUDY    Admission Date: 10/16/2019  Evaluation Date: 10/17/19  Radiologist: Scottie Kent    RECOMMENDATIONS   Diet Recommendations - Solids: Regular  Diet Recommendations - Liquid: Thin, no straws    Further Follow-up:  Follow U Intact  Triggered at: Valleculae  Premature Spillage to: Valleculae  Delay (seconds): (1-2)  Residue Severity, Location: Trace; Throughout pharynx  Cleared/Reduced with: Secondary swallow  Laryngeal Penetration: During the swallow;Transient  Tracheal Aspira Score:  Score 2: Material enters the airway, remains above the vocal folds, and is ejected from the airway     Overall Impression: Pt presents with mild oropharyngeal dysphagia characterized by increased oral transit time and piecemeal deglutition on puree answered to their apparent satisfaction. INTERDISCIPLINARY COMMUNICATION  Reviewed results with Radiologist; agreement verbalized.     Patient Experiencing Pain: No    FOLLOW UP  Treatment Plan/Recommendations: Aspiration precautions  Number of Visits to

## 2019-10-18 VITALS
SYSTOLIC BLOOD PRESSURE: 110 MMHG | TEMPERATURE: 98 F | BODY MASS INDEX: 18.78 KG/M2 | RESPIRATION RATE: 16 BRPM | HEART RATE: 117 BPM | WEIGHT: 106 LBS | HEIGHT: 63 IN | OXYGEN SATURATION: 97 % | DIASTOLIC BLOOD PRESSURE: 69 MMHG

## 2019-10-18 PROCEDURE — 99224 SUBSEQUENT OBSERVATION CARE: CPT | Performed by: OTHER

## 2019-10-18 PROCEDURE — 99217 OBSERVATION CARE DISCHARGE: CPT | Performed by: HOSPITALIST

## 2019-10-18 NOTE — DISCHARGE SUMMARY
[facility]    Discharge Summary    [address] [facility] Patient Status:  Observation    1990 MRN [id number]   Location [facility] Attending Heather Krishnamurthy MD   Hosp Day # 0 PCP Feliz Barron MD     Date of Admission: 10/16/ passed swallow eval  - counseled we will cont to practice her swallowing and monitor her po intake - improved and stable for home today     SMA  - follow up with her primary neurologist     Protein calorie malnutrition  - dietician isabelle Gomez

## 2019-10-18 NOTE — PLAN OF CARE
Problem: Patient Centered Care  Goal: Patient preferences are identified and integrated in the patient's plan of care  Description  Interventions:  - What would you like us to know as we care for you?muscular dystropy  - Provide timely, complete, and acc liquids at a slow rate  - No straws  - Encourage small bites of food and small sips of liquid  - Offer pills one at a time, crush or deliver with applesauce as needed  - Discontinue feeding and notify MD (or speech pathologist) if coughing or persistent th

## 2019-10-18 NOTE — PROGRESS NOTES
[facility]    Progress Note    Dae Brown Patient Status:  Observation    1990 MRN [id number]   Location [facility] Attending Dhiraj Cabello MD   Hosp Day # 0 PCP Lili Varela MD       SUBJECTIVE:    Still having Date: 10/17/2019  CONCLUSION:   Trace penetration without aspiration. Please see dedicated speech pathologist report for further details.     Dictated by (CST): Micki Wilson MD on 10/17/2019 at 9:44     Approved by (CST): Micki Wilson MD on 10/17/2019 at 9

## 2019-10-18 NOTE — PROGRESS NOTES
[facility] AND [facility]  Neurology Progress Note    Doretta Fleischer Patient Status:  Observation    1990 MRN [id number]   Location [address] [facility] Attending Lisa Hidalgo MD   Hosp Day # 0 PCP Walter Hamilton MD     Subjective:  Doretta Fleischer is a( weakness  VIII. Hearing intact. IX. Pallet elevates symmetrically. XI. Shoulder shrug is intact  XII. Tongue is midline     Motor Exam:  Muscle tone diminished in all 4 extremities  Atrophy of distal muscles of the hands and arms.   Also atrophy of calf m aspiration. Please see dedicated speech pathologist report for further details.     Dictated by (CST): Sánchez Sahh MD on 10/17/2019 at 9:44     Approved by (CST): Sánchez Shah MD on 10/17/2019 at 9:44                  Assessment:  Patient Active Problem L

## 2019-10-22 ENCOUNTER — TELEPHONE (OUTPATIENT)
Dept: NEUROLOGY | Facility: CLINIC | Age: 29
End: 2019-10-22

## 2019-10-22 NOTE — TELEPHONE ENCOUNTER
----- Message from Precious Boyd MD sent at 10/22/2019  8:15 AM CDT -----  Please let the patient know that results of these particular lab tests so far were normal.    Thank you

## 2019-10-30 ENCOUNTER — OFFICE VISIT (OUTPATIENT)
Dept: FAMILY MEDICINE CLINIC | Facility: CLINIC | Age: 29
End: 2019-10-30
Payer: COMMERCIAL

## 2019-10-30 VITALS
DIASTOLIC BLOOD PRESSURE: 71 MMHG | TEMPERATURE: 98 F | BODY MASS INDEX: 18.78 KG/M2 | WEIGHT: 106 LBS | HEART RATE: 97 BPM | HEIGHT: 63 IN | SYSTOLIC BLOOD PRESSURE: 116 MMHG

## 2019-10-30 DIAGNOSIS — L73.2 HYDRADENITIS: ICD-10-CM

## 2019-10-30 DIAGNOSIS — R13.10 DYSPHAGIA, UNSPECIFIED TYPE: ICD-10-CM

## 2019-10-30 DIAGNOSIS — G12.9 SMA (SPINAL MUSCULAR ATROPHY) (HCC): Primary | ICD-10-CM

## 2019-10-30 DIAGNOSIS — Z00.00 ROUTINE MEDICAL EXAM: ICD-10-CM

## 2019-10-30 PROCEDURE — 99395 PREV VISIT EST AGE 18-39: CPT | Performed by: FAMILY MEDICINE

## 2019-10-30 NOTE — TELEPHONE ENCOUNTER
Faxed DHS Residual Capacity form to [facility] - [phone]. Mailed copy to pt. Notified pt via [address] [place] [address] [address].

## 2019-10-30 NOTE — TELEPHONE ENCOUNTER
Dr. Margarita Alejandre,    I added the Wheelchair info. Please sign off on form.     It's called \"Updated Residual Cap Dr. Margarita Alejandre 10/30/19\"      Thank you,  [facility]

## 2019-10-30 NOTE — TELEPHONE ENCOUNTER
Dr. Peggy Paris,    Can you please look over this form and let me know if I need to add/change anything especially the grid section with the functional limitations? Thank you. Please sign off on form:  -Highlight the patient and hit \"Chart\" button.   -In Marsha

## 2019-10-30 NOTE — PROGRESS NOTES
HPI:   Dustin Gr is a 34year old female who presents for a complete physical exam.    Had recent hospitalization because something affected her swallow. Unclear if from Remicade - not sure if combo of SMA and remicade.    Had flu shot week before Sarah Woodall denies hx of anemia or easy bruising  ENDOCRINE: denies weight changes  ALL/ASTHMA: denies hx of allergy or asthma    EXAM:   /71   Pulse 97   Temp 98 °F (36.7 °C) (Oral)   Ht 5' 3\" (1.6 m)   Wt 106 lb (48.1 kg)   BMI 18.78 kg/m²     GENERAL: well d

## 2019-12-02 RX ORDER — LEVONORGESTREL AND ETHINYL ESTRADIOL 0.15-0.03
KIT ORAL
Qty: 91 TABLET | Refills: 3 | Status: SHIPPED
Start: 2019-12-02

## 2019-12-02 NOTE — TELEPHONE ENCOUNTER
Please review; protocol failed.     Gynecology Medications  Protocol Criteria:  · Appointment scheduled in the past 12 months or the next 3 months  · Pap smear in the past 12 months  · Pap smear WNL manually verified  Recent Outpatient Visits            1 m

## 2019-12-03 RX ORDER — LEVONORGESTREL AND ETHINYL ESTRADIOL 0.15-0.03
1 KIT ORAL DAILY
Qty: 91 TABLET | Refills: 4 | Status: SHIPPED | OUTPATIENT
Start: 2019-12-03

## 2019-12-03 NOTE — TELEPHONE ENCOUNTER
Pt is overdue for pap, please advise on refill.   Gynecology Medications  Protocol Criteria:  · Appointment scheduled in the past 12 months or the next 3 months  · Pap smear in the past 12 months  · Pap smear WNL manually verified  Recent Outpatient Visits

## 2020-09-09 ENCOUNTER — APPOINTMENT (RX ONLY)
Dept: URBAN - METROPOLITAN AREA CLINIC 168 | Facility: CLINIC | Age: 30
Setting detail: DERMATOLOGY
End: 2020-09-09

## 2020-09-09 VITALS — TEMPERATURE: 98.1 F

## 2020-09-09 DIAGNOSIS — L73.2 HIDRADENITIS SUPPURATIVA: ICD-10-CM

## 2020-09-09 PROCEDURE — ? COUNSELING

## 2020-09-09 PROCEDURE — 99202 OFFICE O/P NEW SF 15 MIN: CPT

## 2020-09-09 PROCEDURE — ? ADDITIONAL NOTES

## 2020-09-09 PROCEDURE — ? PRESCRIPTION

## 2020-09-09 RX ORDER — SPIRONOLACTONE 25 MG/1
2 TABLET, FILM COATED ORAL BID
Qty: 120 | Refills: 6 | Status: ERX | COMMUNITY
Start: 2020-09-09

## 2020-09-09 RX ADMIN — SPIRONOLACTONE 2: 25 TABLET, FILM COATED ORAL at 00:00

## 2020-09-09 ASSESSMENT — LOCATION SIMPLE DESCRIPTION DERM
LOCATION SIMPLE: LEFT THIGH
LOCATION SIMPLE: GLUTEAL CLEFT
LOCATION SIMPLE: RIGHT AXILLARY VAULT
LOCATION SIMPLE: GROIN
LOCATION SIMPLE: LEFT AXILLARY VAULT

## 2020-09-09 ASSESSMENT — LOCATION ZONE DERM
LOCATION ZONE: AXILLAE
LOCATION ZONE: LEG
LOCATION ZONE: TRUNK

## 2020-09-09 ASSESSMENT — LOCATION DETAILED DESCRIPTION DERM
LOCATION DETAILED: LEFT AXILLARY VAULT
LOCATION DETAILED: RIGHT INGUINAL CREASE
LOCATION DETAILED: RIGHT AXILLARY VAULT
LOCATION DETAILED: GLUTEAL CLEFT
LOCATION DETAILED: LEFT ANTERIOR PROXIMAL THIGH

## 2020-09-09 ASSESSMENT — HURLEY STAGE
IN YOUR EXPERIENCE, AMONG ALL PATIENTS YOU HAVE SEEN WITH THIS CONDITION, HOW SEVERE IS THIS PATIENT'S CONDITION?: HURLEY STAGE III: MULTIPLE INTERCONNECTED SINUS TRACTS AND ABSCESSES THROUGHOUT AN ENTIRE AREA

## 2020-09-09 NOTE — PROCEDURE: ADDITIONAL NOTES
Detail Level: Simple
Additional Notes: Patient will have her Spinraza injection at the end of the month.   She gets a lot of labs prior to infusion.  She will forward results to us.  \\nDiscussed starting pentoxifylline as well.  \\nRecommended adding zinc supplements daily as well.\\n\\nReviewed if a biologic is needed, that we would consider Stelara given it's lack of neurologic side effect and also potential benefit if she has any tendancy towards inflammatory bowel disease. Would consider IL17 inhibitors with extreme caution given family hx of Crohn's.

## 2020-09-09 NOTE — HPI: RASH (HIDRADENITIS SUPPURATIVA)
How Severe Is It?: moderate
Is This A New Presentation, Or A Follow-Up?: Rash
Additional History: Patient just moved here 2 months ago.  Her last treatment option was Remicade in October 2019.  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  She is seeing a speech therapist for this now.  Humira was dosed prior to this at 80 mg a week and helped minimally.

## 2020-09-21 ENCOUNTER — APPOINTMENT (OUTPATIENT)
Dept: HEMATOLOGY/ONCOLOGY | Facility: HOSPITAL | Age: 30
End: 2020-09-21
Payer: COMMERCIAL

## 2020-11-24 ENCOUNTER — APPOINTMENT (RX ONLY)
Dept: URBAN - METROPOLITAN AREA CLINIC 168 | Facility: CLINIC | Age: 30
Setting detail: DERMATOLOGY
End: 2020-11-24

## 2020-11-24 VITALS — TEMPERATURE: 97.9 F

## 2020-11-24 DIAGNOSIS — L73.2 HIDRADENITIS SUPPURATIVA: ICD-10-CM

## 2020-11-24 PROCEDURE — 99213 OFFICE O/P EST LOW 20 MIN: CPT | Mod: 25

## 2020-11-24 PROCEDURE — ? PRESCRIPTION

## 2020-11-24 PROCEDURE — ? INTRALESIONAL KENALOG

## 2020-11-24 PROCEDURE — ? TREATMENT REGIMEN

## 2020-11-24 PROCEDURE — 11900 INJECT SKIN LESIONS </W 7: CPT

## 2020-11-24 PROCEDURE — ? SEPARATE AND IDENTIFIABLE DOCUMENTATION

## 2020-11-24 PROCEDURE — ? COUNSELING

## 2020-11-24 RX ORDER — SPIRONOLACTONE 25 MG/1
3 TABLET, FILM COATED ORAL BID
Qty: 180 | Refills: 6 | Status: ERX

## 2020-11-24 ASSESSMENT — LOCATION SIMPLE DESCRIPTION DERM
LOCATION SIMPLE: GROIN
LOCATION SIMPLE: RIGHT AXILLARY VAULT
LOCATION SIMPLE: LEFT AXILLARY VAULT

## 2020-11-24 ASSESSMENT — LOCATION ZONE DERM
LOCATION ZONE: AXILLAE
LOCATION ZONE: TRUNK
LOCATION ZONE: VULVA

## 2020-11-24 ASSESSMENT — LOCATION DETAILED DESCRIPTION DERM
LOCATION DETAILED: LEFT AXILLARY VAULT
LOCATION DETAILED: RIGHT AXILLARY VAULT
LOCATION DETAILED: RIGHT INGUINAL CREASE
LOCATION DETAILED: MONS PUBIS

## 2020-11-24 NOTE — PROCEDURE: TREATMENT REGIMEN
Modify Regimen: Improved. Pt will continue at 50mg BID for now and if no further improvement she can increase to 75mg BID.
Detail Level: Zone
Plan: Discussed adding in pentoxifylline as another treatment if needed.

## 2020-11-24 NOTE — PROCEDURE: INTRALESIONAL KENALOG
Detail Level: Detailed
Include Z78.9 (Other Specified Conditions Influencing Health Status) As An Associated Diagnosis?: Yes
X Size Of Lesion In Cm (Optional): 0
Medical Necessity Clause: This procedure was medically necessary because the lesions that were treated were:
Consent: The risks of atrophy were reviewed with the patient.
Concentration Of Solution Injected (Mg/Ml): 40.0
Kenalog Preparation: Kenalog
Total Volume Injected (Ccs- Only Use Numbers And Decimals): 0.3

## 2021-04-09 ENCOUNTER — APPOINTMENT (RX ONLY)
Dept: URBAN - METROPOLITAN AREA CLINIC 168 | Facility: CLINIC | Age: 31
Setting detail: DERMATOLOGY
End: 2021-04-09

## 2021-04-09 VITALS — TEMPERATURE: 98.1 F

## 2021-04-09 DIAGNOSIS — L73.2 HIDRADENITIS SUPPURATIVA: ICD-10-CM | Status: INADEQUATELY CONTROLLED

## 2021-04-09 PROCEDURE — ? INTRALESIONAL KENALOG

## 2021-04-09 PROCEDURE — ? ADDITIONAL NOTES

## 2021-04-09 PROCEDURE — 11900 INJECT SKIN LESIONS </W 7: CPT

## 2021-04-09 PROCEDURE — ? COUNSELING

## 2021-04-09 PROCEDURE — 99214 OFFICE O/P EST MOD 30 MIN: CPT | Mod: 25

## 2021-04-09 PROCEDURE — ? SEPARATE AND IDENTIFIABLE DOCUMENTATION

## 2021-04-09 PROCEDURE — ? PRESCRIPTION

## 2021-04-09 RX ORDER — PENTOXIFYLLINE 400 MG/1
1 TABLET, EXTENDED RELEASE ORAL TID
Qty: 90 | Refills: 6 | Status: ERX | COMMUNITY
Start: 2021-04-09

## 2021-04-09 RX ADMIN — PENTOXIFYLLINE 1: 400 TABLET, EXTENDED RELEASE ORAL at 00:00

## 2021-04-09 ASSESSMENT — LOCATION DETAILED DESCRIPTION DERM
LOCATION DETAILED: RIGHT AXILLARY VAULT
LOCATION DETAILED: LEFT AXILLARY VAULT
LOCATION DETAILED: RIGHT INGUINAL CREASE

## 2021-04-09 ASSESSMENT — LOCATION SIMPLE DESCRIPTION DERM
LOCATION SIMPLE: RIGHT AXILLARY VAULT
LOCATION SIMPLE: GROIN
LOCATION SIMPLE: LEFT AXILLARY VAULT

## 2021-04-09 ASSESSMENT — LOCATION ZONE DERM
LOCATION ZONE: TRUNK
LOCATION ZONE: AXILLAE

## 2021-04-09 NOTE — PROCEDURE: INTRALESIONAL KENALOG
X Size Of Lesion In Cm (Optional): 0
Detail Level: Detailed
Medical Necessity Clause: This procedure was medically necessary because the lesions that were treated were:
Concentration Of Solution Injected (Mg/Ml): 40.0
Kenalog Preparation: Kenalog
Include Z78.9 (Other Specified Conditions Influencing Health Status) As An Associated Diagnosis?: Yes
Total Volume Injected (Ccs- Only Use Numbers And Decimals): 1
Consent: The risks of atrophy were reviewed with the patient.

## 2021-04-09 NOTE — PROCEDURE: ADDITIONAL NOTES
Additional Notes: Discussed pentoxifylline three times a day.  She has SMA and swallowing issues.  She may start BID if she tries this med. \\n\\nDiscussed potentially getting her involved with our HS trial for bimekizumab as she has not yet tried an IL-17 inhibitor for HS, and this agent has shown great results in treating severe disease.
Render Risk Assessment In Note?: no
Patient Management Risk Assessment: Minimal
Detail Level: Simple

## 2022-04-11 ENCOUNTER — APPOINTMENT (RX ONLY)
Dept: URBAN - METROPOLITAN AREA CLINIC 168 | Facility: CLINIC | Age: 32
Setting detail: DERMATOLOGY
End: 2022-04-11

## 2022-04-11 DIAGNOSIS — L73.2 HIDRADENITIS SUPPURATIVA: ICD-10-CM | Status: IMPROVED

## 2022-04-11 DIAGNOSIS — L20.89 OTHER ATOPIC DERMATITIS: ICD-10-CM | Status: INADEQUATELY CONTROLLED

## 2022-04-11 PROBLEM — L20.84 INTRINSIC (ALLERGIC) ECZEMA: Status: ACTIVE | Noted: 2022-04-11

## 2022-04-11 PROCEDURE — ? ADDITIONAL NOTES

## 2022-04-11 PROCEDURE — ? PRESCRIPTION

## 2022-04-11 PROCEDURE — ? SEPARATE AND IDENTIFIABLE DOCUMENTATION

## 2022-04-11 PROCEDURE — ? COUNSELING

## 2022-04-11 PROCEDURE — 99214 OFFICE O/P EST MOD 30 MIN: CPT

## 2022-04-11 RX ORDER — TRIAMCINOLONE ACETONIDE 1 MG/G
1 CREAM TOPICAL BID
Qty: 45 | Refills: 1 | Status: ERX | COMMUNITY
Start: 2022-04-11

## 2022-04-11 RX ADMIN — TRIAMCINOLONE ACETONIDE 1: 1 CREAM TOPICAL at 00:00

## 2022-04-11 ASSESSMENT — LOCATION DETAILED DESCRIPTION DERM
LOCATION DETAILED: RIGHT ANTERIOR PROXIMAL THIGH
LOCATION DETAILED: RIGHT AXILLARY VAULT
LOCATION DETAILED: LEFT AXILLARY VAULT
LOCATION DETAILED: RIGHT INGUINAL CREASE

## 2022-04-11 ASSESSMENT — LOCATION SIMPLE DESCRIPTION DERM
LOCATION SIMPLE: LEFT AXILLARY VAULT
LOCATION SIMPLE: RIGHT THIGH
LOCATION SIMPLE: RIGHT AXILLARY VAULT
LOCATION SIMPLE: GROIN

## 2022-04-11 ASSESSMENT — LOCATION ZONE DERM
LOCATION ZONE: AXILLAE
LOCATION ZONE: LEG
LOCATION ZONE: TRUNK

## 2022-04-11 NOTE — PROCEDURE: ADDITIONAL NOTES
Patient Management Risk Assessment: Low
Additional Notes: She takes spironolactone 25 mg 2 BID which is very effective relieving pain and discomfort, so will continue this treatment. She notes she tolerates this dose well and that all labs with her other doctors have been WNL, including renal function.
Render Risk Assessment In Note?: no
Detail Level: Simple

## 2023-06-15 ENCOUNTER — APPOINTMENT (RX ONLY)
Dept: URBAN - METROPOLITAN AREA CLINIC 168 | Facility: CLINIC | Age: 33
Setting detail: DERMATOLOGY
End: 2023-06-15

## 2023-06-15 DIAGNOSIS — L73.2 HIDRADENITIS SUPPURATIVA: ICD-10-CM

## 2023-06-15 PROCEDURE — 99214 OFFICE O/P EST MOD 30 MIN: CPT

## 2023-06-15 PROCEDURE — ? SKIN MEDICINALS

## 2023-06-15 PROCEDURE — ? PRESCRIPTION

## 2023-06-15 PROCEDURE — ? TREATMENT REGIMEN

## 2023-06-15 PROCEDURE — ? COUNSELING

## 2023-06-15 RX ORDER — LEVONORGESTREL AND ETHINYL ESTRADIOL 0.15-0.03
1 KIT ORAL QD
Qty: 91 | Refills: 3 | Status: ERX

## 2023-06-15 ASSESSMENT — LOCATION SIMPLE DESCRIPTION DERM
LOCATION SIMPLE: GROIN
LOCATION SIMPLE: LEFT AXILLARY VAULT
LOCATION SIMPLE: RIGHT AXILLARY VAULT

## 2023-06-15 ASSESSMENT — LOCATION ZONE DERM
LOCATION ZONE: AXILLAE
LOCATION ZONE: TRUNK

## 2023-06-15 ASSESSMENT — LOCATION DETAILED DESCRIPTION DERM
LOCATION DETAILED: RIGHT INGUINAL CREASE
LOCATION DETAILED: LEFT AXILLARY VAULT
LOCATION DETAILED: RIGHT AXILLARY VAULT

## 2023-06-15 NOTE — PROCEDURE: TREATMENT REGIMEN
Detail Level: Zone
Initiate Treatment: Will call [facility] pharmacy and inquire if they can compound spironolactone into an oral elixir\\n\\nStart a trial of topical tofacitnib cream. If she finds it effective she can call for an Rx for the larger tube. Rx sent to [facility].\\n\\nRefill given for OCP's -pt note she is  up to date on her well woman checks. She  usually gets her OCP's from her PCP but the last 2 she has had have left the practice and she is having trouble finding a new PCP.
Continue Regimen: Spironolactone 100mg/d

## 2023-06-15 NOTE — PROCEDURE: [facility]
Sig: Apply pea sized amount per area at night
Sig: Take one pill daily
Sig: Apply to affected areas twice daily
Sig: Apply to affected areas on face twice daily
Sig: Apply twice daily for 5 days
Sig: Apply nightly to warts nightly under occlusion
Sig: Use as directed when washing hair
Asher Inhibitor Medicines: Tofacitinib 2%, Niacinamide 2% Cream
Sig: Take one pill twice daily
Sig: Apply a thin layer to the areas with decreased hair density 1-2 times daily
Sig: Wash affected areas daily.
Sig: Apply a thin layer to the affected areas daily
Sig: Apply a thin layer to the itching areas twice daily as needed
Sig: Apply a thin layer to the affected skin twice daily
Sig: Take one twice daily
Sig: Apply a thin layer to the affected nails daily
Sig: Apply a thin layer to the affected areas twice daily
Sig: Apply to the affected skin twice daily
Sig: Apply a thin layer to the scar daily
Detail Level: Simple
Product Type (1): EDITA Inhibitor

## 2024-05-20 ENCOUNTER — RX ONLY (OUTPATIENT)
Age: 34
Setting detail: RX ONLY
End: 2024-05-20

## 2024-05-20 RX ORDER — SPIRONOLACTONE 25 MG/1
3 TABLET, FILM COATED ORAL BID
Qty: 180 | Refills: 0 | Status: ERX

## 2024-06-17 ENCOUNTER — RX ONLY (OUTPATIENT)
Age: 34
Setting detail: RX ONLY
End: 2024-06-17

## 2024-06-17 RX ORDER — SPIRONOLACTONE 25 MG/1
TABLET, FILM COATED ORAL
Qty: 540 | Refills: 1 | Status: ERX | COMMUNITY
Start: 2024-06-17

## 2024-07-01 ENCOUNTER — APPOINTMENT (RX ONLY)
Dept: URBAN - METROPOLITAN AREA CLINIC 168 | Facility: CLINIC | Age: 34
Setting detail: DERMATOLOGY
End: 2024-07-01

## 2024-07-01 DIAGNOSIS — D18.0 HEMANGIOMA: ICD-10-CM

## 2024-07-01 DIAGNOSIS — L73.2 HIDRADENITIS SUPPURATIVA: ICD-10-CM | Status: STABLE

## 2024-07-01 DIAGNOSIS — L20.89 OTHER ATOPIC DERMATITIS: ICD-10-CM | Status: INADEQUATELY CONTROLLED

## 2024-07-01 PROBLEM — D18.01 HEMANGIOMA OF SKIN AND SUBCUTANEOUS TISSUE: Status: ACTIVE | Noted: 2024-07-01

## 2024-07-01 PROCEDURE — ? COUNSELING

## 2024-07-01 PROCEDURE — ? ADDITIONAL NOTES

## 2024-07-01 PROCEDURE — 99214 OFFICE O/P EST MOD 30 MIN: CPT

## 2024-07-01 PROCEDURE — ? PRESCRIPTION MEDICATION MANAGEMENT

## 2024-07-01 PROCEDURE — ? PRESCRIPTION

## 2024-07-01 PROCEDURE — ? PRESCRIPTION SAMPLES PROVIDED

## 2024-07-01 RX ORDER — LEVONORGESTREL AND ETHINYL ESTRADIOL 0.15-0.03
1 KIT ORAL QD
Qty: 91 | Refills: 3 | Status: ERX

## 2024-07-01 RX ORDER — TACROLIMUS 1 MG/G
OINTMENT TOPICAL BID
Qty: 30 | Refills: 4 | Status: ERX | COMMUNITY
Start: 2024-07-01

## 2024-07-01 RX ORDER — SPIRONOLACTONE 25 MG/1
2-4 TABLET, FILM COATED ORAL QD
Qty: 120 | Refills: 5 | Status: ERX

## 2024-07-01 RX ADMIN — TACROLIMUS: 1 OINTMENT TOPICAL at 00:00

## 2024-07-01 ASSESSMENT — SEVERITY ASSESSMENT 2020: SEVERITY 2020: MODERATE

## 2024-07-01 ASSESSMENT — LOCATION ZONE DERM
LOCATION ZONE: TRUNK
LOCATION ZONE: AXILLAE
LOCATION ZONE: FACE
LOCATION ZONE: LEG

## 2024-07-01 ASSESSMENT — LOCATION DETAILED DESCRIPTION DERM
LOCATION DETAILED: RIGHT INGUINAL CREASE
LOCATION DETAILED: LEFT AXILLARY VAULT
LOCATION DETAILED: RIGHT ANTERIOR PROXIMAL THIGH
LOCATION DETAILED: RIGHT CENTRAL MALAR CHEEK
LOCATION DETAILED: RIGHT AXILLARY VAULT

## 2024-07-01 ASSESSMENT — HURLEY STAGE
IN YOUR EXPERIENCE, AMONG ALL PATIENTS YOU HAVE SEEN WITH THIS CONDITION, HOW SEVERE IS THIS PATIENT'S CONDITION?: HURLEY STAGE II: SINGLE OR MULTIPLE, WIDELY SEPARATED RECURRENT ABSCESSES WITH SINUS TRACT FORMATION AND SCARRING

## 2024-07-01 ASSESSMENT — LOCATION SIMPLE DESCRIPTION DERM
LOCATION SIMPLE: RIGHT THIGH
LOCATION SIMPLE: LEFT AXILLARY VAULT
LOCATION SIMPLE: GROIN
LOCATION SIMPLE: RIGHT AXILLARY VAULT
LOCATION SIMPLE: RIGHT CHEEK

## 2024-07-01 NOTE — PROCEDURE: ADDITIONAL NOTES
Additional Notes: Discussed cauterizing to treat as a cosmetic option.
Detail Level: Simple
Render Risk Assessment In Note?: no
Patient Management Risk Assessment: Minimal

## 2024-07-01 NOTE — PROCEDURE: PRESCRIPTION MEDICATION MANAGEMENT
Initiate Treatment: Tacrolimus to hands twice a day
Samples Given: Opzelura
Plan: Has tried/failed triamcinolone. If Tacrolimus ineffective then will apply for Opzelura. \\n\\nOutbreaks affect her hands thus making transferring herself from one seated position to another painful, causing skin splitting and pruritus. 
Detail Level: Zone
Render In Strict Bullet Format?: No
Initiate Treatment: Will try a little area of Opzelura for her HS lesions when she flares.  Discussed if not helpful, can send in RX to [facility] for compounded tofacitnib/niacinamide.
Continue Regimen: Setlakin OCP taken throughout the month which helps
Modify Regimen: Will try to reduce her dose of spironolactone to 75 mg once a day for a few weeks, then decrease to 50 mg if no flares.

## 2024-07-01 NOTE — PROCEDURE: PRESCRIPTION SAMPLES PROVIDED
Samples Given: Opzelura x2 samples
Expiration Date (Optional): 2/28/2025
Lot/Batch Number (Optional): [id number] x2 samples
Detail Level: Zone

## (undated) NOTE — LETTER
No referring provider defined for this encounter. 10/03/17        Patient: Hawk Meyers   YOB: 1990   Date of Visit: 10/3/2017       Dear  Dr. Irineo Najjar, MD,      Thank you for referring Hawk Meyers to my practice.   Please find my a

## (undated) NOTE — MR AVS SNAPSHOT
[facility] [place] [address] [address]  [id number] [phone]               Thank you for choosing us for your health care visit with Nurse. We are glad to serve you and happy to provide you with this summary of your visit. medical emergencies, dial 911. Educational Information     Healthy Diet and Regular Exercise  [employer] for making healthy food choices  -   Enjoy your food, but eat less. Fully enjoy your food when eating.    Don’t eat w